# Patient Record
(demographics unavailable — no encounter records)

---

## 2024-10-10 NOTE — PHYSICAL EXAM
[Cachectic] : cachexia was observed [No Respiratory Distress] : no respiratory distress  [No Edema] : there was no peripheral edema [No Focal Deficits] : no focal deficits [Normal] : affect was normal and insight and judgment were intact

## 2024-10-16 NOTE — PLAN
[FreeTextEntry1] : Total time on this date and for this encounter was 36 minutes which included: reviewing medical record in preparation to see the patient, performing a medically appropriate examination and/or evaluation, counseling and educating the patient, ordering medications, tests, or procedures, referring to and communicating with other health care professionals about management, and documenting clinical information in the electronic health record. >50% of time spent face to face counseling patient

## 2024-10-16 NOTE — ASSESSMENT
[Patient Requires Further Testing] : Patient requires further testing [Patient Optimized for Surgery] : Patient optimized for surgery [FreeTextEntry4] : 10/10/24 - cbc, bmp and hgba1c  - stable  Cardiac clearance from Dr. Thomas reviewed  Patient is to continue aspirin daily without interruption, but to hold all other NSAIDs 5-7 days prior to procedure, not currently on AC for Afib  Of note, patient currently pending results of MRI for evaluation of a pancreatic lesion. Patient also recently in and out of the hospital due to recurrent pneumonia, now stable.  Patient with known history of opioid and alcohol misuse and abuse, recommend close postop monitoring including CIWA protocol   Patient is low-intermediate risk for planned procedure. Patient is medically optimized at the time of this visit with no modifiable risk factors.

## 2024-10-16 NOTE — HISTORY OF PRESENT ILLNESS
[Atrial Fibrillation] : atrial fibrillation [Coronary Artery Disease] : coronary artery disease [Diabetes] : diabetes [Self] : no previous adverse anesthesia reaction [FreeTextEntry4] : 77 y/o male with pmhx of atrial fibrillation, CAD s/p stent 6/2023, COPD not on home O2, type 2 DM hypertension, hyperlipidemia, mild cognitive impairment, presents for preop op eval. Patient is scheduled for a right hip replacement with Dr. Neil on 10/17/24.  Denies chest pain at rest or on exertion, denies shortness of breath. Non smoker. Has not had adverse reactions to anesthesia in the past.

## 2024-10-16 NOTE — HISTORY OF PRESENT ILLNESS
[Atrial Fibrillation] : atrial fibrillation [Coronary Artery Disease] : coronary artery disease [Diabetes] : diabetes [Self] : no previous adverse anesthesia reaction [FreeTextEntry4] : 79 y/o male with pmhx of atrial fibrillation, CAD s/p stent 6/2023, COPD not on home O2, type 2 DM hypertension, hyperlipidemia, mild cognitive impairment, presents for preop op eval. Patient is scheduled for a right hip replacement with Dr. Neil on 10/17/24.  Denies chest pain at rest or on exertion, denies shortness of breath. Non smoker. Has not had adverse reactions to anesthesia in the past.

## 2024-10-31 NOTE — REVIEW OF SYSTEMS
[Cough] : cough [Joint Pain] : joint pain [Joint Stiffness] : joint stiffness [Negative] : Neurological [Wheezing] : no wheezing [Shortness Of Breath] : no shortness of breath [Dyspnea on Exertion] : not dyspnea on exertion [FreeTextEntry9] : s/p right hip repl

## 2024-10-31 NOTE — PHYSICAL EXAM
[No Acute Distress] : no acute distress [Well Nourished] : well nourished [Well Developed] : well developed [Well-Appearing] : well-appearing [No Respiratory Distress] : no respiratory distress  [No Accessory Muscle Use] : no accessory muscle use [Normal Rate] : normal rate  [Regular Rhythm] : with a regular rhythm [Normal S1, S2] : normal S1 and S2 [No Murmur] : no murmur heard [Pedal Pulses Present] : the pedal pulses are present [No Edema] : there was no peripheral edema [No Joint Swelling] : no joint swelling [Coordination Grossly Intact] : coordination grossly intact [No Focal Deficits] : no focal deficits [Speech Grossly Normal] : speech grossly normal [Normal Affect] : the affect was normal [Alert and Oriented x3] : oriented to person, place, and time [Normal Mood] : the mood was normal [Normal Insight/Judgement] : insight and judgment were intact [de-identified] : kamala CALDERON diminished [de-identified] : right ant hip incision well-approximated no s/sx infection

## 2024-10-31 NOTE — HISTORY OF PRESENT ILLNESS
[Post-hospitalization from ___ Hospital] : Post-hospitalization from [unfilled] Hospital [Admitted on: ___] : The patient was admitted on [unfilled] [Discharged on ___] : discharged on [unfilled] [Discharge Summary] : discharge summary [Discharge Med List] : discharge medication list [Med Reconciliation] : medication reconciliation has been completed [Patient Contacted By: ____] : and contacted by [unfilled] [FreeTextEntry2] : Copied from EMR, "Hospital Course 78y/oM PMH CAD s/p PCI, pAF (stopped Xarelto), HTN, HLD, DM2, COPD, recurrent pna, s/p gastric bypass, anemia, recent admissions for multifocal pna in 9/2024 and 10/17/24 at [facility] for R [facility]SON presenting with SOB and cough, admitted with pna. CT chest: chronic LLL consolidation with impacted airways, progressive KHUSHI small airways infection/impaction; new RLL consolidation with impacted airways infection/impaction. started on abx. Bcx ngtd. PT eval rec home PT. Pt to dc home"  Pt seen in his home for transitional care management. He denies SOB, cough w/mucus, no fever, chills. He endorses right hip pain was receiving Percocet and Robaxin in the hospital and is asking for prescriptions at home. Incision healing well, no s/sx infection.

## 2024-10-31 NOTE — REVIEW OF SYSTEMS
[Cough] : cough [Joint Pain] : joint pain [Joint Stiffness] : joint stiffness [Negative] : Neurological [Shortness Of Breath] : no shortness of breath [Wheezing] : no wheezing [Dyspnea on Exertion] : not dyspnea on exertion [FreeTextEntry9] : s/p right hip repl

## 2024-10-31 NOTE — HISTORY OF PRESENT ILLNESS
[Post-hospitalization from ___ Hospital] : Post-hospitalization from [unfilled] Hospital [Admitted on: ___] : The patient was admitted on [unfilled] [Discharged on ___] : discharged on [unfilled] [Discharge Summary] : discharge summary [Discharge Med List] : discharge medication list [Med Reconciliation] : medication reconciliation has been completed [Patient Contacted By: ____] : and contacted by [unfilled] [FreeTextEntry2] : Copied from EMR, "Hospital Course 78y/oM PMH CAD s/p PCI, pAF (stopped Xarelto), HTN, HLD, DM2, COPD, recurrent pna, s/p gastric bypass, anemia, recent admissions for multifocal pna in 9/2024 and 10/17/24 at [facility] for R CARSON presenting with SOB and cough, admitted with pna. CT chest: chronic LLL consolidation with impacted airways, progressive KHUSHI small airways infection/impaction; new RLL consolidation with impacted airways infection/impaction. started on abx. Bcx ngtd. PT eval rec home PT. Pt to dc home"  Pt seen in his home for transitional care management. He denies SOB, cough w/mucus, no fever, chills. He endorses right hip pain was receiving Percocet and Robaxin in the hospital and is asking for prescriptions at home. Incision healing well, no s/sx infection.

## 2024-10-31 NOTE — PHYSICAL EXAM
[No Acute Distress] : no acute distress [Well Nourished] : well nourished [Well Developed] : well developed [Well-Appearing] : well-appearing [No Respiratory Distress] : no respiratory distress  [No Accessory Muscle Use] : no accessory muscle use [Normal Rate] : normal rate  [Regular Rhythm] : with a regular rhythm [Normal S1, S2] : normal S1 and S2 [No Murmur] : no murmur heard [Pedal Pulses Present] : the pedal pulses are present [No Edema] : there was no peripheral edema [No Joint Swelling] : no joint swelling [Coordination Grossly Intact] : coordination grossly intact [No Focal Deficits] : no focal deficits [Speech Grossly Normal] : speech grossly normal [Normal Affect] : the affect was normal [Alert and Oriented x3] : oriented to person, place, and time [Normal Mood] : the mood was normal [Normal Insight/Judgement] : insight and judgment were intact [de-identified] : kamala CALDERON diminished [de-identified] : right ant hip incision well-approximated no s/sx infection

## 2024-11-08 NOTE — ASSESSMENT
[FreeTextEntry1] : Pneumonia continue doxycycline 100mg bid and medrol dose pack will reach out to pulm, patient needs to follow up due to recurrent episodes of pneumonia and weight loss

## 2024-11-08 NOTE — PLAN
[FreeTextEntry1] : Total time on this date and for this encounter was 33 minutes which included: reviewing medical record in preparation to see the patient, performing a medically appropriate examination and/or evaluation, counseling and educating the patient, ordering medications, tests, or procedures, referring to and communicating with other health care professionals about management, and documenting clinical information in the electronic health record. >50% of time spent face to face counseling patient

## 2024-11-08 NOTE — HISTORY OF PRESENT ILLNESS
[de-identified] : 79 y/o male with pmhx of atrial fibrillation, CAD s/p stent 6/2023, COPD not on home O2, hypertension, hyperlipidemia, mild cognitive impairment presents for follow up. Patient was seen at urgent care yesterday due to cough, congestion and SOB. He was given a medrol dose pack and a 10 day course of doxycycline 100mg bid. He states his symptoms are improving.

## 2024-11-08 NOTE — PHYSICAL EXAM
[Cachectic] : cachexia was observed [No Respiratory Distress] : no respiratory distress  [No Edema] : there was no peripheral edema [No Focal Deficits] : no focal deficits [Normal] : affect was normal and insight and judgment were intact [de-identified] : b/l wheezing and rhonchi

## 2024-11-18 NOTE — ASSESSMENT
[FreeTextEntry1] : 78 year old male with history of CAD s/p PCI, PAF (stopped Xarelto temporarily now back on it), HTN, HLD, DM 2, COPD, Recurrent Pneumonia, Gastric Bypass and Anemia, presents today for follow up hospitalization at [facility] 10/25 for pneumonia. Still has persistent LLL collapse with new RLL consolidation, and progressive tree-in-bud nodularity throughout the lung, especially in the KHUSHI.   - Will proceed with bronchoscopy for inspection of chronic LLL collapse, and will take tbbx, BAL, evaluate for NTM infection, fungal, etc - I discussed risks and benefits of the bronchoscopy which include: hypoxemia, pneumothorax, and bleeding. After discussion with him and his wife over the phone, we collectively decided that the benefits of the procedure outweigh these risks. - PFTs show mild obstruction on spirometry, evidence of restriction on lung volumes, and moderate impairment in diffusion capacity. - Will obtain sputum culture + AFB culture - Trial of 3% saline + albtuerol nebs q 12 h for airway clearance - No need for new course of antibiotics for now, will await cultures - GI follow up with Dr. Campbell regarding MRCP findings as he may need ERCP to evaluate for hepatobiliary causes of weight loss. I sent an email to him and have discussed w/ his PCP Dr. Young who is in agreement. - RTC in 6 weeks